# Patient Record
(demographics unavailable — no encounter records)

---

## 2025-07-22 NOTE — HISTORY OF PRESENT ILLNESS
[FreeTextEntry1] : Pt w/ R TNBC SD (7/25) Mammo/Sono (7/11/25): fatty, +1.7cm mass R 8:00 N7 > Bx rec'ed Mammo/Sono (6/26/24): FG, NSF R Sono Core Bx (R 7:00 N10, R Ax LN)(7/14/25): +IDCA, SBR III, ER-, NM-, Her2 -, Ki67: >90% in both No prior Breast Surgery, Breast Procedures or Nipple Discharge. +FH Br Ca (P. FC x 2: 30'2, 50's) S/P SC Hyst (?+/- BSO)(2007)([facility]) No FH Ovarian, Pancreatic Cancer or Melanoma.  Colonoscopy (2023): "WNL" > 5yrs PAP/Pelvic (>5 yrs): "WNL"   +h/o COVID > recovered

## 2025-07-24 NOTE — CONSULT LETTER
[Dear  ___] : Dear  [unfilled], [Consult Letter:] : I had the pleasure of evaluating your patient, [unfilled]. [Please see my note below.] : Please see my note below. [Consult Closing:] : Thank you very much for allowing me to participate in the care of this patient.  If you have any questions, please do not hesitate to contact me. [Sincerely,] : Sincerely, [FreeTextEntry3] : Letty Vickers DO, FACSEAN, FACP Medical Oncology and Hematology [facility]

## 2025-07-24 NOTE — HISTORY OF PRESENT ILLNESS
[de-identified] : Ms. Mustafa is a 62 year old female that presents for initial consultation for newly diagnosed breast cancer.   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 R axilla metastatic carcinoma replacing LN tissue ER/ME/HER2 neg   Breast Cancer Risk Factors: Menarche: 14 Date of LMP: 48 Menopause: 48  Age at first live birth: 29 Nursed: n Hysterectomy: y   Oophorectomy: n OCP: 1y HRT: n Last pap/pelvic exam: Dr Verma has not seen in many years  Related family history see chart  Ashkenazi: n BRCA testing: scheduled with genetics  no

## 2025-07-24 NOTE — CONSULT LETTER
[Dear  ___] : Dear  [unfilled], [Consult Letter:] : I had the pleasure of evaluating your patient, [unfilled]. [Please see my note below.] : Please see my note below. [Consult Closing:] : Thank you very much for allowing me to participate in the care of this patient.  If you have any questions, please do not hesitate to contact me. [Sincerely,] : Sincerely, [FreeTextEntry3] : Letty Vickers DO, FACSEAN, FACP Medical Oncology and Hematology [facility]

## 2025-07-24 NOTE — HISTORY OF PRESENT ILLNESS
[de-identified] : Ms. Mustafa is a 62 year old female that presents for initial consultation for newly diagnosed breast cancer.   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 R axilla metastatic carcinoma replacing LN tissue ER/OH/HER2 neg   Breast Cancer Risk Factors: Menarche: 14 Date of LMP: 48 Menopause: 48  Age at first live birth: 29 Nursed: n Hysterectomy: y   Oophorectomy: n OCP: 1y HRT: n Last pap/pelvic exam: Dr Verma has not seen in many years  Related family history see chart  Ashkenazi: n BRCA testing: scheduled with genetics

## 2025-07-24 NOTE — ASSESSMENT
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 Pathological complete response was 64.8% in the pembrolizumab-chemotherapy group and 51.2% in the placebo-chemotherapy group. Discussed KEYNOTE 522 regimen and reviewed side effect profile. pembrolizumab (200 mg) once every 3 weeks plus paclitaxel (80 mg per square meter of body-surface area once weekly) plus carboplatin (at a dose based on an area under the concentration-time curve of 5 mg per milliliter per minute once every 3 weeks  followed by four cycles of pembrolizumab  plus doxorubicin (60 mg per square meter) plus cyclophosphamide (600 mg per square meter once every 3 weeks in the subsequent 12 weeks). In the adjuvant phase, patients would receive pembrolizumab once every 3 weeks for up to nine cycles.  patient will need port and echo (once starting adriamycin) [facility] ordered give TNBC - she is seeing genetics  #thalassemia monitor counts  RTC in 2 weeks to review PET CT and to start treatment

## 2025-07-24 NOTE — ASSESSMENT
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 Pathological complete response was 64.8% in the pembrolizumab-chemotherapy group and 51.2% in the placebo-chemotherapy group. Discussed KEYNOTE 522 regimen and reviewed side effect profile. pembrolizumab (200 mg) once every 3 weeks plus paclitaxel (80 mg per square meter of body-surface area once weekly) plus carboplatin (at a dose based on an area under the concentration-time curve of 5 mg per milliliter per minute once every 3 weeks  followed by four cycles of pembrolizumab  plus doxorubicin (60 mg per square meter) plus cyclophosphamide (600 mg per square meter once every 3 weeks in the subsequent 12 weeks). In the adjuvant phase, patients would receive pembrolizumab once every 3 weeks for up to nine cycles.  patient will need port and echo (once starting adriamycin) [facility] ordered give TNBC - she is seeing genetics  #thalassemia monitor counts  RTC in 2 weeks to review PET CT and to start treatment